# Patient Record
Sex: MALE | Race: WHITE | NOT HISPANIC OR LATINO | URBAN - METROPOLITAN AREA
[De-identification: names, ages, dates, MRNs, and addresses within clinical notes are randomized per-mention and may not be internally consistent; named-entity substitution may affect disease eponyms.]

---

## 2020-06-01 ENCOUNTER — OFFICE VISIT (OUTPATIENT)
Dept: URBAN - METROPOLITAN AREA CLINIC 35 | Facility: CLINIC | Age: 50
End: 2020-06-01

## 2020-06-02 ENCOUNTER — TELEPHONE ENCOUNTER (OUTPATIENT)
Dept: URBAN - METROPOLITAN AREA CLINIC 35 | Facility: CLINIC | Age: 50
End: 2020-06-02

## 2020-06-05 ENCOUNTER — OFFICE VISIT (OUTPATIENT)
Dept: URBAN - METROPOLITAN AREA SURGERY CENTER 8 | Facility: SURGERY CENTER | Age: 50
End: 2020-06-05

## 2020-06-23 ENCOUNTER — DASHBOARD ENCOUNTERS (OUTPATIENT)
Age: 50
End: 2020-06-23

## 2020-06-23 ENCOUNTER — TELEPHONE ENCOUNTER (OUTPATIENT)
Dept: URBAN - METROPOLITAN AREA CLINIC 35 | Facility: CLINIC | Age: 50
End: 2020-06-23

## 2020-06-23 ENCOUNTER — OFFICE VISIT (OUTPATIENT)
Dept: URBAN - METROPOLITAN AREA CLINIC 35 | Facility: CLINIC | Age: 50
End: 2020-06-23

## 2020-06-23 VITALS — BODY MASS INDEX: 35.89 KG/M2 | HEIGHT: 72 IN | WEIGHT: 265 LBS

## 2020-06-23 PROBLEM — 87522002 IRON DEFICIENCY ANEMIA: Status: ACTIVE | Noted: 2020-05-12

## 2020-06-23 PROBLEM — 196735001 CSG - CHRONIC SUPERFICIAL GASTRITIS: Status: ACTIVE | Noted: 2020-05-12

## 2020-06-23 PROBLEM — 428283002 HISTORY OF POLYP OF COLON (SITUATION): Status: ACTIVE | Noted: 2020-05-12

## 2020-06-23 PROBLEM — 274774002 ELEVATED LEVELS OF TRANSAMINASE & LACTIC ACID DEHYDROGENASE: Status: ACTIVE | Noted: 2020-06-23

## 2020-06-23 PROBLEM — 80774000 HELICOBACTER PYLORI: Status: ACTIVE | Noted: 2020-06-23

## 2020-06-23 RX ORDER — ROSUVASTATIN CALCIUM 20 MG/1
1 TABLET TABLET, FILM COATED ORAL ONCE A DAY
Qty: 30 | Status: ACTIVE | COMMUNITY
Start: 2020-05-12

## 2020-06-23 RX ORDER — LISINOPRIL 20 MG/1
1 TABLET TABLET ORAL ONCE A DAY
Qty: 30 | Status: ACTIVE | COMMUNITY
Start: 2020-05-12

## 2020-06-23 RX ORDER — FERROUS SULFATE 325(65) MG
1 TABLET TABLET ORAL ONCE A DAY
Status: ON HOLD | COMMUNITY

## 2020-06-23 RX ORDER — SODIUM, POTASSIUM,MAG SULFATES 17.5-3.13G
177 ML SOLUTION, RECONSTITUTED, ORAL ORAL
Qty: 354 MILLILITER | Refills: 0 | Status: DISCONTINUED | COMMUNITY
Start: 2020-05-13

## 2020-06-23 NOTE — HPI-MIGRATED HPI
;   ;   ;     Anemia : He denies increased fatigue, dizziness, coldness or chills, cold extremities, or melena.  He has not been taking an iron supplements.  Last visit (5/12/2020)Patient presents tody for folow up anemia.  Patient denies  recent labs completed.      Last visit (9/1/2019) Patient presents for a recheck of anemia. He continues to deny any increased fatigue, dizziness, coldness or chills, or cold extremities, or melena. Since his physical, patient has been taking an iron supplement daily.;   Surveillance Colonoscopy : Patient presents today via televisit with consent after his colonoscopy.  He denies complications following his procedure. Currently admit 3-4 normal and formed bowel movements per day. Denies melena, blood or mucus in stools.   Last visit (5/12/2020) Patient is due for a colonoscopy at this time. Patient's last colonoscopy was completed 7/31/2015 revealing two tubular adenoma polyps within the proximal transverse colon.   Patient admits 4-5 bowel movements daily . Stools are firm. Patient denies any episodes of rectal bleeding or mucus at this time. ;   Elevated Liver Enzymes : Patient was found to have elevated liver enzymes with labs from PCP (01/20/2020). He denies completing labs since last visit and is scheduled to have a physical with PCP next month.  Patient currently denies jaundice, chills, RUQ pains, dizziness, easy bruising, or fatigue.    Last visit (5/12/2020)Patient denies a history of elevated liver enzymes.  Patient currently denies jaundice, chills, RUQ pains, dizziness, easy bruising, fatigue.   RISK FACTORS: Denies   Alcohol, drugs, travel outside the [place], NSAID's, protein supplements, tattoos, piercing's,  service, blood transfusion, family history of liver disease or cancer, personal exposure to liver diseases, [facility], rehab.   PCP (01/20/2020) CMP   ALB 4.4 (WNL)  CECILIO 0.5 (WNL)  ALK 81 (WNL) AST 43 ( H ) ALT 30 (WNL);